# Patient Record
Sex: MALE | Race: WHITE | URBAN - METROPOLITAN AREA
[De-identification: names, ages, dates, MRNs, and addresses within clinical notes are randomized per-mention and may not be internally consistent; named-entity substitution may affect disease eponyms.]

---

## 2017-04-27 ENCOUNTER — TRANSFERRED RECORDS (OUTPATIENT)
Dept: HEALTH INFORMATION MANAGEMENT | Facility: CLINIC | Age: 27
End: 2017-04-27

## 2017-04-28 ENCOUNTER — HOSPITAL ENCOUNTER (INPATIENT)
Facility: CLINIC | Age: 27
LOS: 3 days | Discharge: HOME | DRG: 885 | End: 2017-05-01
Attending: PSYCHIATRY & NEUROLOGY | Admitting: PSYCHIATRY & NEUROLOGY
Payer: MEDICAID

## 2017-04-28 PROBLEM — F32.A DEPRESSION: Status: ACTIVE | Noted: 2017-04-28

## 2017-04-28 PROCEDURE — 99223 1ST HOSP IP/OBS HIGH 75: CPT | Mod: AI | Performed by: PSYCHIATRY & NEUROLOGY

## 2017-04-28 PROCEDURE — 12400001 ZZH R&B MH UMMC

## 2017-04-28 RX ORDER — OLANZAPINE 10 MG/2ML
10 INJECTION, POWDER, FOR SOLUTION INTRAMUSCULAR
Status: DISCONTINUED | OUTPATIENT
Start: 2017-04-28 | End: 2017-05-01 | Stop reason: HOSPADM

## 2017-04-28 RX ORDER — ALUMINA, MAGNESIA, AND SIMETHICONE 2400; 2400; 240 MG/30ML; MG/30ML; MG/30ML
30 SUSPENSION ORAL EVERY 4 HOURS PRN
Status: DISCONTINUED | OUTPATIENT
Start: 2017-04-28 | End: 2017-05-01 | Stop reason: HOSPADM

## 2017-04-28 RX ORDER — TRAZODONE HYDROCHLORIDE 50 MG/1
50 TABLET, FILM COATED ORAL
Status: DISCONTINUED | OUTPATIENT
Start: 2017-04-28 | End: 2017-05-01 | Stop reason: HOSPADM

## 2017-04-28 RX ORDER — ACETAMINOPHEN 325 MG/1
650 TABLET ORAL EVERY 4 HOURS PRN
Status: DISCONTINUED | OUTPATIENT
Start: 2017-04-28 | End: 2017-05-01 | Stop reason: HOSPADM

## 2017-04-28 RX ORDER — BISACODYL 10 MG
10 SUPPOSITORY, RECTAL RECTAL DAILY PRN
Status: DISCONTINUED | OUTPATIENT
Start: 2017-04-28 | End: 2017-05-01 | Stop reason: HOSPADM

## 2017-04-28 RX ORDER — HYDROXYZINE HYDROCHLORIDE 25 MG/1
25-50 TABLET, FILM COATED ORAL EVERY 4 HOURS PRN
Status: DISCONTINUED | OUTPATIENT
Start: 2017-04-28 | End: 2017-05-01 | Stop reason: HOSPADM

## 2017-04-28 RX ORDER — OLANZAPINE 10 MG/1
10 TABLET ORAL
Status: DISCONTINUED | OUTPATIENT
Start: 2017-04-28 | End: 2017-05-01 | Stop reason: HOSPADM
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
## 2017-04-28 NOTE — PROGRESS NOTES
Occupational Therapy Non Attendance: Pt has not been seen since admission in scheduled OT sessions. Encourage group attendance as able to identify treatment goals, identify MI sx and their impact on occupational performance, implement positive coping skills.  Pt will be given Self Assessment form, explain the purpose of including them in their treatment plan and offer options for meeting their needs.
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
## 2017-04-28 NOTE — IP AVS SNAPSHOT
MRN:[id number]                      After Visit Summary   4/28/2017    Cole Luna    MRN: [id number]           Thank you!     Thank you for choosing [facility] for your care. Our goal is always to provide you with excellent care.        Patient Information     Date Of Birth          1990        Designated Caregiver       Most Recent Value    Caregiver    Will someone help with your care after discharge? no [Pt is not sure what his discharge will look like]      About your hospital stay     You were admitted on:  April 28, 2017 You last received care in the:  [facility]    You were discharged on:  May 1, 2017       Who to Call     For medical emergencies, please call 911.  For non-urgent questions about your medical care, please call your primary care provider or clinic, [phone]          Attending Provider     Provider Specialty    Eric Tripp MD Psychiatry       Primary Care Provider Office Phone # Fax #    Garth Putnam PA-C [phone]       XXX RESIGNED XXX [id number]  [place] [address]        Further instructions from your care team       Behavioral Discharge Planning and Instructions    Summary: Patient admitted for thoughts of self harm and harm to others. A petition for commitment was presented and not supported by [employer]. Patient does not have active insurance at this time but did complete a medical assistance form with the business office. He is requesting discharge. He denies any thoughts to harm self or harm others.    Main Diagnosis: depression, substance abuse disorder    Major Treatments, Procedures and Findings: Psychological assessment    Symptoms to Report: feeling more aggressive, increased confusion, losing more sleep, mood getting worse or thoughts of suicide    Lifestyle Adjustment: Please refrain from all mood altering substances.    Psychiatry Follow-up:   Patient does not have insurance at this time.    Referrals for  mental health services when MA is in place.  [facility]- they have [facility] locations in the [facility]  [phone]    [facility]-  They also have multiple locations   [phone]    Resources:   Crisis Intervention: 700.183.3361 or 549-642-6776 (TTY: 939.268.6547).  Call anytime for help.  [place] on Mental Illness (www.mn.juanito.org): 849.890.1804 or 281-158-9727.  Alcoholics Anonymous (www.alcoholics-anonymous.org): Check your phone book for your local chapter.  Suicide Awareness Voices of Education (SAVE) (www.save.org): 677-380-XGPO (3475)  National Suicide Prevention Line (www.mentalDealsNear.memn.org): 551-857-MUWN (9069)    General Medication Instructions:   See your medication sheet(s) for instructions.   Take all medicines as directed.  Make no changes unless your doctor suggests them.   Go to all your doctor visits.  Be sure to have all your required lab tests. This way, your medicines can be refilled on time.  Do not use any drugs not prescribed by your doctor.  Avoid alcohol.      The treatment team has appreciated the opportunity to work with you.  We wish you the best in the future.  If you have any questions or concerns our unit number is [phone].    Pending Results     No orders found from 4/26/2017 to 4/29/2017.            Statement of Approval     Ordered          05/01/17 1239  I have reviewed and agree with all the recommendations and orders detailed in this document.  EFFECTIVE NOW     Approved and electronically signed by:  Eric Tripp MD             Admission Information     Date & Time Provider Department Dept. Phone    4/28/2017 Eric Tripp MD [facility] [phone]      Your Vitals Were     Blood Pressure Pulse Temperature Respirations Weight BMI (Body Mass Index)    118/44 82 98  F (36.7  C) (Oral) 16 62.6 kg (138 lb) 19.94 kg/m2      MyChart Information     [facility] lets you send messages to your doctor, view your test results, renew your  "prescriptions, schedule appointments and more. To sign up, go to [email or web address] . Click on \"Log in\" on the left side of the screen, which will take you to the Welcome page. Then click on \"Sign up Now\" on the right side of the page.     You will be asked to enter the access code listed below, as well as some personal information. Please follow the directions to create your username and password.     Your access code is: [email or web address]  Expires: 2017 12:39 PM     Your access code will  in 90 days. If you need help or a new code, please call your [facility] clinic or [phone].        Care EveryWhere ID     This is your Care EveryWhere ID. This could be used by other organizations to access your [facility] medical records  [id number]           Review of your medicines      CONTINUE these medicines which have NOT CHANGED        Dose / Directions    cholecalciferol 1000 UNIT tablet   Commonly known as:  vitamin D   Used for:  Mild major depression (H)        Dose:  1000 Units   Take 1 tablet (1,000 Units) by mouth daily   Quantity:  100 tablet   Refills:  3                Protect others around you: Learn how to safely use, store and throw away your medicines at www.disposemymeds.org.             Medication List: This is a list of all your medications and when to take them. Check marks below indicate your daily home schedule. Keep this list as a reference.      Medications           Morning Afternoon Evening Bedtime As Needed    cholecalciferol 1000 UNIT tablet   Commonly known as:  vitamin D   Take 1 tablet (1,000 Units) by mouth daily                                  "

## 2017-04-28 NOTE — IP AVS SNAPSHOT
[address] [address]    Phone:  [phone]                                       After Visit Summary   4/28/2017    Cole Luna    MRN: [id number]           After Visit Summary Signature Page     I have received my discharge instructions, and my questions have been answered. I have discussed any challenges I see with this plan with the nurse or doctor.    ..........................................................................................................................................  Patient/Patient Representative Signature      ..........................................................................................................................................  Patient Representative Print Name and Relationship to Patient    ..................................................               ................................................  Date                                            Time    ..........................................................................................................................................  Reviewed by Signature/Title    ...................................................              ..............................................  Date                                                            Time

## 2017-04-28 NOTE — PROGRESS NOTES
Completely unresponsive to staff today.  He has not eaten nor drank today.  He refused his vitals this AM.  Staff went into his room just now to take them and he deliberately hid under the covers and refused.  This RN suspects he is is feigning sleeping at times.

## 2017-04-28 NOTE — PROGRESS NOTES
A petition for commitment was called in to [facility] however they called back and said pt was with [facility]. Writer called [facility] and dr. Samson will be staffing the case. Writer faxed petition and support statement to [employer].

## 2017-04-28 NOTE — PROGRESS NOTES
04/28/17 0453   Patient Belongings   Did you bring any home meds/supplements to the hospital?  No   Patient Belongings cell phone/electronics;clothing;plastic bag;Judaism item;shoes;other (see comments)   Disposition of Belongings locker, security    Belongings Search Yes   Clothing Search Yes   Second Staff Daune    General Info Comment black bag, mulitiple relgious items, notebooks, multiple hygenie items, 2 blue jeans, long sleeve shirt, 2 broken phones, lighter, leather knife gao, black coat, black shoes, black bag, three ring binder, stocking cap, playing cards, and an envelope sent to security see chart for information    ADMISSION:  I am responsible for any personal items that are not sent to the safe or pharmacy. [facility] is not responsible for loss, theft or damage of any property in my possession.    Patient Signature _____________________ Date/Time _____________________    Staff Signature _______________________ Date/Time _____________________    2nd Staff person, if patient is unable/unwilling to sign  ___________________________________ Date/Time _____________________  DISCHARGE:  All personal items have been returned to me.    Patient Signature _____________________ Date/Time _____________________    Staff Signature _______________________ Date/Time _____________________

## 2017-04-28 NOTE — PROGRESS NOTES
Pt stayed in bed throughout the entire shift. Pt did not come out of his room for medications or meals. Pt did not make any requests for food or medications. This writer went in to attempt to get a set of vitals. Pt said it was ok, but then went under his blankets and would not lift the blankets to get a set of vitals. When staff would attempt to lift the blanket pt would sternly pull the blanket away. Staff was not able to assess how the pt was doing at all this shift.      04/28/17 1424   Behavioral Health   Hallucinations denies / not responding to hallucinations   Thinking other (see comment)  (unable to assess)   Memory other (see comment)  (unable to assess)   Insight poor   Affect tense   Mood irritable   Physical Appearance/Attire attire appropriate to age and situation   Hygiene neglected grooming - unclean body, hair, teeth   Suicidality other (see comments)  (unable to assess)   Self Injury other (see comment)  (unable to assess)   Activity isolative;withdrawn   Speech clear   Sleep/Rest/Relaxation   Day/Evening Time Hours napping   Number of hours napping 8 hours   Group Therapy Session   Group Attendance refused to attend group session   Behavioral Health Interventions   Depression maintain safety precautions;maintain safe secure environment   Social and Therapeutic Interventions (Depression) encourage socialization with peers;encourage participation in therapeutic groups and milieu activities

## 2017-04-29 LAB
ALBUMIN SERPL-MCNC: 3.6 G/DL (ref 3.4–5)
ALP SERPL-CCNC: 67 U/L (ref 40–150)
ALT SERPL W P-5'-P-CCNC: 15 U/L (ref 0–70)
ANION GAP SERPL CALCULATED.3IONS-SCNC: 5 MMOL/L (ref 3–14)
AST SERPL W P-5'-P-CCNC: 7 U/L (ref 0–45)
BILIRUB SERPL-MCNC: 1 MG/DL (ref 0.2–1.3)
BUN SERPL-MCNC: 12 MG/DL (ref 7–30)
CALCIUM SERPL-MCNC: 8.6 MG/DL (ref 8.5–10.1)
CHLORIDE SERPL-SCNC: 107 MMOL/L (ref 94–109)
CHOLEST SERPL-MCNC: 114 MG/DL
CO2 SERPL-SCNC: 30 MMOL/L (ref 20–32)
CREAT SERPL-MCNC: 0.94 MG/DL (ref 0.66–1.25)
GFR SERPL CREATININE-BSD FRML MDRD: NORMAL ML/MIN/1.7M2
GLUCOSE SERPL-MCNC: 98 MG/DL (ref 70–99)
HDLC SERPL-MCNC: 65 MG/DL
LDLC SERPL CALC-MCNC: 43 MG/DL
NONHDLC SERPL-MCNC: 49 MG/DL
POTASSIUM SERPL-SCNC: 4.1 MMOL/L (ref 3.4–5.3)
PROT SERPL-MCNC: 6.8 G/DL (ref 6.8–8.8)
SODIUM SERPL-SCNC: 142 MMOL/L (ref 133–144)
T4 FREE SERPL-MCNC: 1.12 NG/DL (ref 0.76–1.46)
TRIGL SERPL-MCNC: 28 MG/DL
TSH SERPL DL<=0.005 MIU/L-ACNC: 0.17 MU/L (ref 0.4–4)

## 2017-04-29 PROCEDURE — 84439 ASSAY OF FREE THYROXINE: CPT | Performed by: NURSE PRACTITIONER

## 2017-04-29 PROCEDURE — 25000132 ZZH RX MED GY IP 250 OP 250 PS 637: Performed by: NURSE PRACTITIONER

## 2017-04-29 PROCEDURE — 80053 COMPREHEN METABOLIC PANEL: CPT | Performed by: NURSE PRACTITIONER

## 2017-04-29 PROCEDURE — 36415 COLL VENOUS BLD VENIPUNCTURE: CPT | Performed by: NURSE PRACTITIONER

## 2017-04-29 PROCEDURE — 12400001 ZZH R&B MH UMMC

## 2017-04-29 PROCEDURE — 84443 ASSAY THYROID STIM HORMONE: CPT | Performed by: NURSE PRACTITIONER

## 2017-04-29 PROCEDURE — 80061 LIPID PANEL: CPT | Performed by: NURSE PRACTITIONER

## 2017-04-29 RX ADMIN — OLANZAPINE 10 MG: 10 TABLET, FILM COATED ORAL at 18:53

## 2017-04-29 RX ADMIN — OLANZAPINE 10 MG: 10 TABLET, FILM COATED ORAL at 12:49

## 2017-04-29 RX ADMIN — HYDROXYZINE HYDROCHLORIDE 50 MG: 25 TABLET ORAL at 13:15

## 2017-04-29 ASSESSMENT — ACTIVITIES OF DAILY LIVING (ADL)
SWALLOWING: 0-->SWALLOWS FOODS/LIQUIDS WITHOUT DIFFICULTY
EATING: 0-->INDEPENDENT
AMBULATION: 0-->INDEPENDENT
RETIRED_COMMUNICATION: 0-->UNDERSTANDS/COMMUNICATES WITHOUT DIFFICULTY
DRESS: 0-->INDEPENDENT
AMBULATION: 0-->INDEPENDENT
COMMUNICATION: 0-->UNDERSTANDS/COMMUNICATES WITHOUT DIFFICULTY
TOILETING: 0-->INDEPENDENT
TOILETING: 0-->INDEPENDENT
TRANSFERRING: 0-->INDEPENDENT
SWALLOWING: 0-->SWALLOWS FOODS/LIQUIDS WITHOUT DIFFICULTY
CHANGE_IN_FUNCTIONAL_STATUS_SINCE_ONSET_OF_CURRENT_ILLNESS/INJURY: NO
PRIOR_FUNCTIONAL_LEVEL_COMMENT: INDEPENDENT
LAUNDRY: WITH SUPERVISION
DRESS: 0-->INDEPENDENT
CURRENT_FUNCTIONAL_LEVEL_COMMENT: INDEPENDENT
BATHING: 0-->INDEPENDENT
BATHING: 0-->INDEPENDENT
TRANSFERRING: 0-->INDEPENDENT
ORAL_HYGIENE: INDEPENDENT
DRESS: SCRUBS (BEHAVIORAL HEALTH)
RETIRED_EATING: 0-->INDEPENDENT
GROOMING: INDEPENDENT
COGNITION: 0 - NO COGNITION ISSUES REPORTED
FALL_HISTORY_WITHIN_LAST_SIX_MONTHS: NO
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
## 2017-04-29 NOTE — PROGRESS NOTES
Attempted to do the admission  interview with patient but her refused to answer any questions.  Patient was lying in his bed with his sheet and blanket over his head and not responding to questions being asked.

## 2017-04-29 NOTE — PROGRESS NOTES
After several attempts to meet with pt to complete his admission interview, he declined to answer questions. Pt spent the majority of the shift isolated in his room sleeping.    Will continue to assess.

## 2017-04-30 PROCEDURE — 12400001 ZZH R&B MH UMMC

## 2017-04-30 PROCEDURE — 25000132 ZZH RX MED GY IP 250 OP 250 PS 637: Performed by: NURSE PRACTITIONER

## 2017-04-30 RX ADMIN — HYDROXYZINE HYDROCHLORIDE 50 MG: 25 TABLET ORAL at 13:11

## 2017-04-30 RX ADMIN — HYDROXYZINE HYDROCHLORIDE 50 MG: 25 TABLET ORAL at 17:50

## 2017-04-30 RX ADMIN — OLANZAPINE 10 MG: 10 TABLET, FILM COATED ORAL at 18:19

## 2017-04-30 ASSESSMENT — ACTIVITIES OF DAILY LIVING (ADL)
ORAL_HYGIENE: INDEPENDENT
ORAL_HYGIENE: INDEPENDENT
HYGIENE/GROOMING: INDEPENDENT
DRESS: SCRUBS (BEHAVIORAL HEALTH)
DRESS: SCRUBS (BEHAVIORAL HEALTH)
HYGIENE/GROOMING: INDEPENDENT

## 2017-04-30 NOTE — PROGRESS NOTES
Pt has visible all shift, his affect is flat, and he stated that he is anxious, scoring his anxiety a 7 out 10. Pt also stated that he is feeling better today, and is less depressed today. He also stated that is not feeling suicidal.
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
## 2017-05-01 VITALS
HEART RATE: 82 BPM | BODY MASS INDEX: 19.94 KG/M2 | RESPIRATION RATE: 16 BRPM | SYSTOLIC BLOOD PRESSURE: 118 MMHG | WEIGHT: 138 LBS | TEMPERATURE: 98 F | DIASTOLIC BLOOD PRESSURE: 44 MMHG

## 2017-05-01 PROCEDURE — 99239 HOSP IP/OBS DSCHRG MGMT >30: CPT | Performed by: PSYCHIATRY & NEUROLOGY

## 2017-05-01 PROCEDURE — 97150 GROUP THERAPEUTIC PROCEDURES: CPT | Mod: GO

## 2017-05-01 PROCEDURE — 90853 GROUP PSYCHOTHERAPY: CPT

## 2017-05-01 ASSESSMENT — ACTIVITIES OF DAILY LIVING (ADL)
LAUNDRY: WITH SUPERVISION
ORAL_HYGIENE: INDEPENDENT
GROOMING: INDEPENDENT
DRESS: INDEPENDENT
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
## 2017-05-01 NOTE — PROGRESS NOTES
Dr. Samson for [facility] PPS called to say they will not support petition citing 'not enough evidence for mental illness.' Dr. Samson did speak with pt's mother prior to the PPS teams decision.

## 2017-05-01 NOTE — DISCHARGE INSTRUCTIONS
Behavioral Discharge Planning and Instructions    Summary: Patient admitted for thoughts of self harm and harm to others. A petition for commitment was presented and not supported by [facility]. Patient does not have active insurance at this time but did complete a medical assistance form with the business office. He is requesting discharge. He denies any thoughts to harm self or harm others.    Main Diagnosis: depression, substance abuse disorder    Major Treatments, Procedures and Findings: Psychological assessment    Symptoms to Report: feeling more aggressive, increased confusion, losing more sleep, mood getting worse or thoughts of suicide    Lifestyle Adjustment: Please refrain from all mood altering substances.    Psychiatry Follow-up:   Patient does not have insurance at this time.    Referrals for mental health services when MA is in place.  [facility]  [phone]    [facility]-  They also have multiple locations   [phone]    Resources:   Crisis Intervention: 855.899.8311 or 748-084-2792 (TTY: 505.622.6105).  Call anytime for help.  National [place] on Mental Illness (www.mn.juanito.org): 615.546.5692 or 534-361-0662.  Alcoholics Anonymous (www.alcoholics-anonymous.org): Check your phone book for your local chapter.  Suicide Awareness Voices of Education (SAVE) (www.save.org): 832-825-ICCH (1768)  National Suicide Prevention Line (www.mentalhealthmn.org): 946-858-DKQF (2352)    General Medication Instructions:   See your medication sheet(s) for instructions.   Take all medicines as directed.  Make no changes unless your doctor suggests them.   Go to all your doctor visits.  Be sure to have all your required lab tests. This way, your medicines can be refilled on time.  Do not use any drugs not prescribed by your doctor.  Avoid alcohol.      The treatment team has appreciated the opportunity to work with you.  We wish you the best in the future.   If you have any questions or concerns our unit number is [phone].

## 2017-05-01 NOTE — PROGRESS NOTES
05/01/17 1220   Behavioral Health   Hallucinations denies / not responding to hallucinations   Thinking poor concentration   Orientation person: oriented;place: oriented;date: oriented;time: oriented   Memory baseline memory   Insight poor   Judgement impaired   Eye Contact at examiner   Affect blunted, flat   Mood mood is calm   Physical Appearance/Attire attire appropriate to age and situation   Hygiene well groomed   Suicidality (denies)   Self Injury (denies)   Activity isolative;withdrawn   Speech clear;coherent   Medication Sensitivity no stated side effects   Psychomotor / Gait balanced   Psycho Education   Type of Intervention 1:1 intervention   Response participates, initiates socially appropriate   Hours 0.5   Activities of Daily Living   Hygiene/Grooming independent   Oral Hygiene independent   Dress independent   Laundry with supervision   Room Organization independent   pt less isolative and irritable. Pt attending groups and has flat and pleasant affect. Pt stated he has jury court tomorrow. Pt denies SI and SIB. Pt showered

## 2017-05-01 NOTE — PROGRESS NOTES
Patient reports feeling depressed but denies SI/SIB. Patient isolated to his room almost all shift napping, but engaged with staff a bit more than previous shifts when he did come out.     04/30/17 2200   Behavioral Health   Hallucinations denies / not responding to hallucinations   Thinking poor concentration   Orientation person: oriented;place: oriented   Memory baseline memory   Insight poor   Judgement impaired   Eye Contact at examiner   Affect blunted, flat;tense   Mood depressed   Physical Appearance/Attire attire appropriate to age and situation   Hygiene neglected grooming - unclean body, hair, teeth   Suicidality other (see comments)  (Denies)   Self Injury other (see comment)  (Denies)   Activity isolative;withdrawn   Speech clear;coherent   Medication Sensitivity no stated side effects;no observed side effects   Psychomotor / Gait balanced;steady   Activities of Daily Living   Hygiene/Grooming independent   Oral Hygiene independent   Dress scrubs (behavioral health)   Room Organization independent

## 2017-05-02 ENCOUNTER — CARE COORDINATION (OUTPATIENT)
Dept: CARE COORDINATION | Facility: CLINIC | Age: 27
End: 2017-05-02

## 2017-05-02 NOTE — LETTER
My Access Plan   Presenting Problem Signs and Symptoms Treatment Plan    Questions or concerns during clinic hours    I will call the clinic directly:    [facility]   [address]  [place] [address]   [phone]        Questions or concerns outside clinic hours    I will call the 24 hour nurse line at [phone]    Patient needs to schedule an appointment    I will call the 24 hour scheduling team at [phone] or clinic directly at [phone]    Same day treatment     I will call the clinic first, nurse line if after hours, urgent care and express care if needed   Clinic Care Coordinators (RN/Social Work):   Pedro Pablo Chávez RN   [facility]      Pedro Pablo AQUINO [phone]    Nathan ALBERTOW  [phone]       Crisis Services: Behavioral or Mental Health   [facility] (Behavioral Health Providers) [phone]    [facility] [phone]    [facility] [place] [phone]    [facility] (24/7): [phone]       Emergency treatment--Immediately    CAll 621

## 2017-05-02 NOTE — LETTER
[facility] [place]  [address]  [place] [address]  [phone]    May 2, 2017      Cole Luna  [address]  [place] [address]        Dear Cole,    I am a SW Care Coordinator, working with the care team at your clinic including your primary care provider,Garth Putnam.  I have been trying to reach you to introduce you to [facility] Care Coordination Program. The Care Coordinator is a nurse or  who understands the health care system. The goal of Care Coordination is to help you manage your health and improve access to the [facility] system in the most efficient manner.      The registered nurse (RN) assists you in meeting your health care goals by providing education, coordinating services, and strengthening the communication among your providers. The  (SW) is available to assist in financial, behavioral, psychosocial, and chemical dependency and counseling/psychiatric resources.     Please feel free to contact me at [phone]. I look forward to your call and partnering with you to achieve your optimal state of wellness.  We at [facility] are focused on providing you with the highest-quality healthcare experience possible and that all starts with you.       Sincerely,         MIYA Goldberg  Care Coordination  [facility] [place]  [phone]  [email or web address]

## 2017-05-02 NOTE — PLAN OF CARE
"Occupational Therapy:  Pt was a bit reticent, although not unpleasant, in OT-facilitated group.  He described himself as \"shy.\"  Pt was able to identify healthy lifestyle choices which are effective for him.  "

## 2017-05-02 NOTE — PROGRESS NOTES
Clinic Care Coordination Contact  [facility]/Voicemail    Referral Source: Non-[facility]  Clinical Data: Care Coordinator Outreach  Outreach attempted x 1.  Left message on voicemail with call back information and requested return call.  Plan: Care Coordinator will mail out care coordination introduction letter with care coordinator contact information and explanation of care coordination services. Care Coordinator will try to reach patient again in 1-2 business days.    MIYA Goldberg  Care Coordination  [place]  [phone]  [email or web address]

## 2017-05-09 NOTE — PROGRESS NOTES
Clinic Care Coordination Contact  [facility]/Voicemail    Referral Source: Non-[facility]  Clinical Data: Care Coordinator Outreach  Outreach attempted x 2.  Left message on voicemail with call back information and requested return call.  Plan: Care Coordinator mailed out care coordination introduction letter on 5/2/2017. Care Coordinator will do no further outreaches at this time.    MIYA Goldberg  Care Coordination  [place]  [phone]  [email or web address]

## 2021-11-30 NOTE — PLAN OF CARE
Problem: Discharge Planning  Goal: Discharge Planning (Adult, OB, Behavioral, Peds)  Outcome: Adequate for Discharge Date Met:  05/01/17  Cole was discharged.  He denied being suicidal.   He was cooperative with all discharge procedures.  He is not on any medications.  He applied for MA while in the hospital and once that is active he has the phone numbers of some outpatient therapy providers he can call to get into therapy.  He signed his discharge form and valuable sheet.       no

## 2023-06-19 ENCOUNTER — LAB REQUISITION (OUTPATIENT)
Dept: LAB | Facility: CLINIC | Age: 33
End: 2023-06-19

## 2023-06-19 DIAGNOSIS — Z31.440 ENCOUNTER OF MALE FOR TESTING FOR GENETIC DISEASE CARRIER STATUS FOR PROCREATIVE MANAGEMENT: ICD-10-CM

## 2023-06-19 PROCEDURE — 88230 TISSUE CULTURE LYMPHOCYTE: CPT | Mod: ORL | Performed by: OBSTETRICS & GYNECOLOGY

## 2023-06-19 PROCEDURE — 99207 CHROMOSOME ANALYSIS, BLOOD, HIGH RESOLUTION: CPT

## 2023-07-14 LAB
CULTURE HARVEST COMPLETE DATE: NORMAL

## 2023-07-19 LAB
INTERPRETATION: NORMAL
ISCN: NORMAL
METHODS: NORMAL